# Patient Record
Sex: MALE | Race: WHITE | NOT HISPANIC OR LATINO | Employment: FULL TIME | URBAN - METROPOLITAN AREA
[De-identification: names, ages, dates, MRNs, and addresses within clinical notes are randomized per-mention and may not be internally consistent; named-entity substitution may affect disease eponyms.]

---

## 2024-03-17 ENCOUNTER — APPOINTMENT (OUTPATIENT)
Dept: RADIOLOGY | Facility: IMAGING CENTER | Age: 39
End: 2024-03-17
Payer: COMMERCIAL

## 2024-03-17 ENCOUNTER — OFFICE VISIT (OUTPATIENT)
Dept: URGENT CARE | Facility: PHYSICIAN GROUP | Age: 39
End: 2024-03-17
Payer: COMMERCIAL

## 2024-03-17 VITALS
OXYGEN SATURATION: 95 % | DIASTOLIC BLOOD PRESSURE: 72 MMHG | TEMPERATURE: 98.2 F | RESPIRATION RATE: 16 BRPM | BODY MASS INDEX: 32.23 KG/M2 | HEART RATE: 89 BPM | HEIGHT: 72 IN | WEIGHT: 238 LBS | SYSTOLIC BLOOD PRESSURE: 124 MMHG

## 2024-03-17 DIAGNOSIS — R05.1 ACUTE COUGH: ICD-10-CM

## 2024-03-17 DIAGNOSIS — J45.21 MILD INTERMITTENT ASTHMA WITH ACUTE EXACERBATION: ICD-10-CM

## 2024-03-17 PROCEDURE — 71046 X-RAY EXAM CHEST 2 VIEWS: CPT | Mod: TC | Performed by: RADIOLOGY

## 2024-03-17 PROCEDURE — 3074F SYST BP LT 130 MM HG: CPT

## 2024-03-17 PROCEDURE — 99214 OFFICE O/P EST MOD 30 MIN: CPT | Mod: 25

## 2024-03-17 PROCEDURE — 3078F DIAST BP <80 MM HG: CPT

## 2024-03-17 PROCEDURE — 94640 AIRWAY INHALATION TREATMENT: CPT

## 2024-03-17 RX ORDER — LIDOCAINE HYDROCHLORIDE 20 MG/ML
SOLUTION OROPHARYNGEAL
COMMUNITY
Start: 2024-02-28

## 2024-03-17 RX ORDER — ALBUTEROL SULFATE 90 UG/1
2 AEROSOL, METERED RESPIRATORY (INHALATION) EVERY 6 HOURS PRN
Qty: 8.5 G | Refills: 0 | Status: SHIPPED | OUTPATIENT
Start: 2024-03-17

## 2024-03-17 RX ORDER — IPRATROPIUM BROMIDE AND ALBUTEROL SULFATE 2.5; .5 MG/3ML; MG/3ML
3 SOLUTION RESPIRATORY (INHALATION) ONCE
Status: COMPLETED | OUTPATIENT
Start: 2024-03-17 | End: 2024-03-17

## 2024-03-17 RX ORDER — PREDNISONE 10 MG/1
40 TABLET ORAL DAILY
Qty: 20 TABLET | Refills: 0 | Status: SHIPPED | OUTPATIENT
Start: 2024-03-17 | End: 2024-03-22

## 2024-03-17 RX ORDER — DEXTROMETHORPHAN HYDROBROMIDE AND PROMETHAZINE HYDROCHLORIDE 15; 6.25 MG/5ML; MG/5ML
5 SYRUP ORAL EVERY 6 HOURS PRN
Qty: 100 ML | Refills: 0 | Status: SHIPPED | OUTPATIENT
Start: 2024-03-17

## 2024-03-17 RX ORDER — BENZONATATE 100 MG/1
100 CAPSULE ORAL 3 TIMES DAILY PRN
Qty: 30 CAPSULE | Refills: 0 | Status: SHIPPED | OUTPATIENT
Start: 2024-03-17 | End: 2024-03-27

## 2024-03-17 RX ADMIN — IPRATROPIUM BROMIDE AND ALBUTEROL SULFATE 3 ML: 2.5; .5 SOLUTION RESPIRATORY (INHALATION) at 13:08

## 2024-03-17 NOTE — PROGRESS NOTES
Chief Complaint   Patient presents with    Cough     X 2 weeks          Subjective:   HISTORY OF PRESENT ILLNESS: Filiberto Rizo is a 38 y.o. male who presents for persistent cough, S/P URI   Patient denies recent fevers. Hx of asthma, does not use any daily medication, has not needed inhlaer.  No SOB, but feels wheezing    Medications, Allergies, current problem list, Social and Family history reviewed today in Epic.     Objective:     /72 (BP Location: Right arm, Patient Position: Sitting, BP Cuff Size: Adult)   Pulse 89   Temp 36.8 °C (98.2 °F) (Temporal)   Resp 16   Ht 1.829 m (6')   Wt 108 kg (238 lb)   SpO2 95%     Physical Exam  Vitals reviewed.   Constitutional:       Appearance: Normal appearance. He is not toxic-appearing.   HENT:      Head:      Jaw: No trismus.      Right Ear: Tympanic membrane normal.      Left Ear: Tympanic membrane normal.      Nose: Rhinorrhea present.      Mouth/Throat:      Mouth: Mucous membranes are moist.      Pharynx: Uvula midline. Posterior oropharyngeal erythema present. No pharyngeal swelling, oropharyngeal exudate or uvula swelling.      Tonsils: No tonsillar exudate or tonsillar abscesses. 1+ on the right. 1+ on the left.      Comments: No muffled voice, trismus, unilateral deviation of the uvula, soft palate fullness or edema. No oral airway compromise, or drooling noted.   Eyes:      Conjunctiva/sclera: Conjunctivae normal.   Cardiovascular:      Rate and Rhythm: Normal rate and regular rhythm.      Heart sounds: Normal heart sounds.   Pulmonary:      Effort: Pulmonary effort is normal. No respiratory distress.      Breath sounds: No stridor. Wheezing present. No decreased breath sounds or rhonchi.   Musculoskeletal:      Cervical back: Full passive range of motion without pain and neck supple.   Skin:     General: Skin is warm and dry.   Neurological:      Mental Status: He is alert and oriented to person, place, and time.   Psychiatric:         Mood and  Affect: Mood normal.        Assessment/Plan:     Diagnosis and associated orders    I personally reviewed prior external notes and test results pertinent to today's visit.     1. Acute cough  DX-CHEST-2 VIEWS    ipratropium-albuterol (DUONEB) nebulizer solution    albuterol 108 (90 Base) MCG/ACT Aero Soln inhalation aerosol    predniSONE (DELTASONE) 10 MG Tab    promethazine-dextromethorphan (PROMETHAZINE-DM) 6.25-15 MG/5ML syrup    benzonatate (TESSALON) 100 MG Cap      2. Mild intermittent asthma with acute exacerbation  lidocaine (XYLOCAINE) 2 % Solution    DX-CHEST-2 VIEWS    ipratropium-albuterol (DUONEB) nebulizer solution    albuterol 108 (90 Base) MCG/ACT Aero Soln inhalation aerosol    predniSONE (DELTASONE) 10 MG Tab    promethazine-dextromethorphan (PROMETHAZINE-DM) 6.25-15 MG/5ML syrup    benzonatate (TESSALON) 100 MG Cap            IMPRESSION:  Pt has stable vital signs and no red flag symptoms or exam findings identified.   Informed pt that symptoms are consistent with reactive airway disease secondary to recent URI.  Some clearing of his inspiratory wheezing after duo-neb, persistent end resp wheezing noted but pt  no resp distress.  Advised to begin medications with strict Ed precautions in place for worsening     Differential diagnosis discussed. Pt was Educated on red flag symptoms. Pt has been Instructed to return to Urgent Care or nearest Emergency Department if symptoms fail to improve, for any change in condition, further concerns, or new concerning symptoms. Patient states understanding of the plan of care and discharge instructions.  They are discharged in stable condition.         Please note that this dictation was created using voice recognition software. I have made a reasonable attempt to correct obvious errors, but I expect that there are errors of grammar and possibly content that I did not discover before finalizing the note.    This note was electronically signed by Beata Montana  NOHEMI